# Patient Record
(demographics unavailable — no encounter records)

---

## 2025-03-25 NOTE — HISTORY OF PRESENT ILLNESS
[FreeTextEntry1] : Comorbid obesity Low vitamin D Epigastric discomfort [de-identified] : Patient returns to discuss management of the above issues.  States that she has not been on Zepbound (current doses 7.5 mg for the past month) which has been extremely well-tolerated and has achieved an 18 pound weight loss while using the medication which is the first time that she can remember losing weight.  She wishes to discuss options of increasing dose or changing medication. She also wishes to discuss findings on recent upper endoscopy.  States that prior symptoms of epigastric discomfort have essentially resolved spontaneously. Patient is also aware that her vitamin D level is low.

## 2025-03-25 NOTE — ASSESSMENT
[FreeTextEntry1] : 1.)  Comorbid obesity (associated with HTN) REVIEWED serial weights and BMI.  9/2024 her weight was 270/43.6.  Today her weight is 252/40.6. Advised that this is a substantial weight loss and is difficult for Zepbound results.  However, would anticipate at least another 18 to 20 pound weight loss if she continues medication.  Recommend referral to medical weight management to oversee treatment but patient declines.  She is anxious to increase her Zepbound dose.  Prescription for Zepbound 10 mg was submitted to pharmacy.  Follow-up assessment in 4 months at the time of her CPE. Patient will contact me for any side effects or concerns about Zepbound usage.  #2) Prediabetes Follow-up hemoglobin A1c sent today for assessment of response to Zepbound use.  Note that her prior hemoglobin A1c level had fluctuated between 5.9 and 6.1, most recently 6.0 in July. Anticipate that her hemoglobin A1c will now be sufficiently under 5.7.  #3) low vitamin D REVIEWED serial vitamin D levels which have fluctuated between 18 and 26, most recently 21.7. Patient admits to noncompliance with supplementation but states she is more likely to be compliant if she is able to take it once weekly at high dose. Prescription submitted to pharmacy for high-dose vitamin D (50,000 units) with instructions to take 1 tablet on a weekly basis. Will follow-up levels at her next visit.